# Patient Record
Sex: FEMALE | Race: WHITE | NOT HISPANIC OR LATINO | Employment: FULL TIME
[De-identification: names, ages, dates, MRNs, and addresses within clinical notes are randomized per-mention and may not be internally consistent; named-entity substitution may affect disease eponyms.]

---

## 2017-12-03 ENCOUNTER — HEALTH MAINTENANCE LETTER (OUTPATIENT)
Age: 57
End: 2017-12-03

## 2023-08-07 ENCOUNTER — TRANSFERRED RECORDS (OUTPATIENT)
Dept: HEALTH INFORMATION MANAGEMENT | Facility: CLINIC | Age: 63
End: 2023-08-07

## 2023-08-07 LAB
ALT SERPL-CCNC: 26 U/L (ref 6–29)
AST SERPL-CCNC: 21 U/L (ref 10–35)
CHOLESTEROL (EXTERNAL): 4.1 MG/DL
CREATININE (EXTERNAL): 0.74 MG/DL (ref 0.5–1.05)
GFR ESTIMATED (EXTERNAL): 91 ML/MIN/1.73M2
GLUCOSE (EXTERNAL): 98 MG/DL (ref 65–99)
HBA1C MFR BLD: 5.3 %
HDLC SERPL-MCNC: 40 MG/DL
NON HDL CHOL. (LDL+VLDL): 125 MG/DL
POTASSIUM (EXTERNAL): 4.5 MMOL/L (ref 3.5–5.3)
TRIGLYCERIDES (EXTERNAL): 140 MG/DL
TSH SERPL-ACNC: 2.12 MU/L (ref 0.4–4.5)

## 2023-11-16 ENCOUNTER — OFFICE VISIT (OUTPATIENT)
Dept: CARDIOLOGY | Facility: CLINIC | Age: 63
End: 2023-11-16
Payer: COMMERCIAL

## 2023-11-16 VITALS
RESPIRATION RATE: 20 BRPM | OXYGEN SATURATION: 98 % | HEART RATE: 70 BPM | HEIGHT: 66 IN | WEIGHT: 190.1 LBS | SYSTOLIC BLOOD PRESSURE: 130 MMHG | DIASTOLIC BLOOD PRESSURE: 86 MMHG | BODY MASS INDEX: 30.55 KG/M2

## 2023-11-16 DIAGNOSIS — Z91.89 INTERMEDIATE RISK FOR CORONARY ARTERY DISEASE: ICD-10-CM

## 2023-11-16 DIAGNOSIS — R07.9 CHEST PAIN, UNSPECIFIED TYPE: Primary | ICD-10-CM

## 2023-11-16 PROCEDURE — 99203 OFFICE O/P NEW LOW 30 MIN: CPT | Performed by: INTERNAL MEDICINE

## 2023-11-16 RX ORDER — VITAMIN B COMPLEX
50 TABLET ORAL DAILY
COMMUNITY

## 2023-11-16 RX ORDER — ATORVASTATIN CALCIUM 20 MG/1
20 TABLET, FILM COATED ORAL DAILY
COMMUNITY
End: 2023-11-16

## 2023-11-16 RX ORDER — LOPERAMIDE HYDROCHLORIDE 2 MG/1
2 TABLET ORAL PRN
COMMUNITY

## 2023-11-16 NOTE — PROGRESS NOTES
[facility] HEART CARE   [address]  [place] [address]   [email or web address]   OFFICE: [phone]     CARDIOLOGY CLINIC NOTE     Thank you, Dr. Roman, Dixie Potter, for asking the [facility] Heart Care team to see Ms. Heather Vizcarra to evaluate New Patient (Chest pain, cardiac risk)       Assessment/Recommendations   Assessment:    Chest pain - with borderline high blood pressure and family history of CAD in both parents, she is at intermediate risk of CAD. Chest pain possibly due to angina.    Plan:  CT coronary angiogram to evaluate for ischemic CAD.   Follow up pending abnormal results.         History of Present Illness   Ms. Heather Vizcarra is a 63 year old female who presents for evaluation of cardiac risk and chest pain.    Ms. Alejo Vizcarra has been experiencing chest pain that is associated with emotional stress for the past several months. Located left side of chest. She relates this to stress at work. Unable to correlated with exertion. She is not very active and does not get regular exercise. She is concerned about coronary disease as her father had a CABG in his early 60s.     Other than noted above, Ms. Alejo Vizcarra denies any chest pain/pressure/tightness, shortness of breath at rest or with exertion, light headedness/dizziness, pre-syncope, syncope, lower extremity swelling, palpitations, paroxysmal nocturnal dyspnea (PND), or orthopnea.     Cardiac Problems and Cardiac Diagnostics     Most Recent Cardiac testing:  ECG dated 7/20/21 (personaly reviewed and interpreted): normal sinus rhythm.           Medications  Allergies   Current Outpatient Medications   Medication Sig Dispense Refill    aspirin-acetaminophen-caffeine (EXCEDRIN MIGRAINE) 250-250-65 MG tablet Take 1 tablet by mouth daily as needed      Ibuprofen (ADVIL PO) Take 200 mg by mouth daily as needed for moderate pain      loperamide (IMODIUM A-D) 2 MG tablet  "Take 2 mg by mouth as needed Takes up to 3 to stop diarrhea      Vitamin D3 (CHOLECALCIFEROL) 25 mcg (1000 units) tablet Take 50 mcg by mouth daily      AMOXICILLIN PO Take 250 mg by mouth daily as needed (acne outbreaks) (Patient not taking: Reported on 11/16/2023)      Loratadine (CLARITIN PO) Take 1 tablet by mouth daily as needed (seasonal allergies) (Patient not taking: Reported on 11/16/2023)        Allergies   Allergen Reactions    Erythromycin GI Disturbance    Animal Dander Other (See Comments)    Cats Unknown    Dog Epithelium Allergy Skin Test Other (See Comments)    Dust Mites Unknown    Lactose Unknown    Pollen Extract Headache    Short Ragweed Pollen Ext Other (See Comments)    Trees Headache and Other (See Comments)    Atorvastatin Muscle Pain (Myalgia)        Physical Examination Review of Systems   Vitals: /86 (BP Location: Right arm, Patient Position: Sitting, Cuff Size: Adult Regular)   Pulse 70   Resp 20   Ht 1.664 m (5' 5.5\")   Wt 86.2 kg (190 lb 1.6 oz)   SpO2 98%   BMI 31.15 kg/m    BMI= Body mass index is 31.15 kg/m .  Wt Readings from Last 3 Encounters:   11/16/23 86.2 kg (190 lb 1.6 oz)   12/05/16 79.9 kg (176 lb 1.6 oz)   10/25/16 82.7 kg (182 lb 4.8 oz)       General: pleasant female. No acute distress.   HENT: external ears normal. Nares patent. Mucous membranes moist.  Eyes: perrla, extraocular muscles intact. No scleral icterus.   Neck: No JVD  Lungs: clear to auscultation  COR: regular rate and rhythm, No murmurs, rubs, or gallops  Abd: nondistended, soft  Extrem: No edema        Please refer above for cardiac ROS details.       Past History   Past Medical History:   Past Medical History:   Diagnosis Date    IBS (irritable bowel syndrome)     Migraine     PONV (postoperative nausea and vomiting)     Psoriasis     Urine incontinence         Past Surgical History:   Past Surgical History:   Procedure Laterality Date    COLONOSCOPY      DILATION AND CURETTAGE, OPERATIVE " "HYSTEROSCOPY WITH MORCELLATOR, COMBINED N/A 10/25/2016    Procedure: COMBINED DILATION AND CURETTAGE, OPERATIVE HYSTEROSCOPY WITH MORCELLATOR;  Surgeon: Dixie Roman MD;  Location: [facility]    HYSTERECTOMY VAGINAL, COLPORRHAPHY ANTERIOR, POSTERIOR, COMBINED N/A 12/5/2016    Procedure: COMBINED HYSTERECTOMY VAGINAL, COLPORRHAPHY ANTERIOR, POSTERIOR;  Surgeon: Dixie Roman MD;  Location: [facility]    KNEE SURGERY      THYROID SURGERY      TONSILLECTOMY          Family History:   Mother with CVA and atrial fibrillation  Father with CAD, CABG, CVA      Social History:   Social History     Socioeconomic History    Marital status:      Spouse name: Not on file    Number of children: Not on file    Years of education: Not on file    Highest education level: Not on file   Occupational History    Not on file   Tobacco Use    Smoking status: Never    Smokeless tobacco: Not on file   Substance and Sexual Activity    Alcohol use: No    Drug use: No    Sexual activity: Not on file   Other Topics Concern    Not on file   Social History Narrative    Not on file     Social Determinants of Health     Financial Resource Strain: Not on file   Food Insecurity: Not on file   Transportation Needs: Not on file   Physical Activity: Not on file   Stress: Not on file   Social Connections: Not on file   Interpersonal Safety: Not on file   Housing Stability: Not on file            Lab Results    Chemistry/lipid CBC Cardiac Enzymes/BNP/TSH/INR   No results found for: \"CHOL\", \"HDL\", \"TRIG\", \"CHOLHDL\", \"CREATININE\", \"BUN\", \"NA\", \"CO2\"  8/8/23 from her OB-GYN office  Na 140  K 4.5   Cl 104  CO2 30  BUN 17  Cr 0.7    Total Chol 165  HDL 40    Trig 140   No results found for: \"WBC\", \"HGB\", \"HCT\", \"MCV\", \"PLT\" No results found for: \"CKTOTAL\", \"CKMB\", \"TROPONINI\", \"BNP\", \"TSH\", \"INR\"         "

## 2023-11-16 NOTE — PATIENT INSTRUCTIONS
It was a pleasure to meet with you today.      Below is a summary of your visit.   Schedule a CT scan of your heart to evaluate for coronary artery disease. If your insurance does not cover this then we would evaluate with a stress test and cardiac calcium scan.  We will call you to inform you of your test or procedure results within 3 business days of the test being performed.  If you do not hear from our office with the test results within 1 week please do not hesitate to call asking for these results.  Follow up as needed based on test results.     Please do not hesitate to call the [employer] [facility] Care Clinic with any questions or concerns at [phone].     Sincerely,

## 2023-11-16 NOTE — LETTER
11/16/2023    Dixie Roman MD, MD  [address]  [facility] [address]    RE: Heather Vizcarra       Dear Colleague,     I had the pleasure of seeing Heather Vizcarra in the [facility] Heart Clinic.    [facility] HEART CARE   [address]  [place] [address]   [email or web address]   OFFICE: [phone]     CARDIOLOGY CLINIC NOTE     Thank you, Dr. Roman, Dixie Potter, for asking the [facility] Heart Care team to see Ms. Heather Vizcarra to evaluate New Patient (Chest pain, cardiac risk)       Assessment/Recommendations   Assessment:    Chest pain - with borderline high blood pressure and family history of CAD in both parents, she is at intermediate risk of CAD. Chest pain possibly due to angina.    Plan:  CT coronary angiogram to evaluate for ischemic CAD.   Follow up pending abnormal results.         History of Present Illness   Ms. Heather Vizcarra is a 63 year old female who presents for evaluation of cardiac risk and chest pain.    Ms. Alejo Vizcarra has been experiencing chest pain that is associated with emotional stress for the past several months. Located left side of chest. She relates this to stress at work. Unable to correlated with exertion. She is not very active and does not get regular exercise. She is concerned about coronary disease as her father had a CABG in his early 60s.     Other than noted above, Ms. Alejo Vizcarra denies any chest pain/pressure/tightness, shortness of breath at rest or with exertion, light headedness/dizziness, pre-syncope, syncope, lower extremity swelling, palpitations, paroxysmal nocturnal dyspnea (PND), or orthopnea.     Cardiac Problems and Cardiac Diagnostics     Most Recent Cardiac testing:  ECG dated 7/20/21 (personaly reviewed and interpreted): normal sinus rhythm.           Medications  Allergies   Current Outpatient Medications   Medication Sig Dispense  "Refill    aspirin-acetaminophen-caffeine (EXCEDRIN MIGRAINE) 250-250-65 MG tablet Take 1 tablet by mouth daily as needed      Ibuprofen (ADVIL PO) Take 200 mg by mouth daily as needed for moderate pain      loperamide (IMODIUM A-D) 2 MG tablet Take 2 mg by mouth as needed Takes up to 3 to stop diarrhea      Vitamin D3 (CHOLECALCIFEROL) 25 mcg (1000 units) tablet Take 50 mcg by mouth daily      AMOXICILLIN PO Take 250 mg by mouth daily as needed (acne outbreaks) (Patient not taking: Reported on 11/16/2023)      Loratadine (CLARITIN PO) Take 1 tablet by mouth daily as needed (seasonal allergies) (Patient not taking: Reported on 11/16/2023)        Allergies   Allergen Reactions    Erythromycin GI Disturbance    Animal Dander Other (See Comments)    Cats Unknown    Dog Epithelium Allergy Skin Test Other (See Comments)    Dust Mites Unknown    Lactose Unknown    Pollen Extract Headache    Short Ragweed Pollen Ext Other (See Comments)    Trees Headache and Other (See Comments)    Atorvastatin Muscle Pain (Myalgia)        Physical Examination Review of Systems   Vitals: /86 (BP Location: Right arm, Patient Position: Sitting, Cuff Size: Adult Regular)   Pulse 70   Resp 20   Ht 1.664 m (5' 5.5\")   Wt 86.2 kg (190 lb 1.6 oz)   SpO2 98%   BMI 31.15 kg/m    BMI= Body mass index is 31.15 kg/m .  Wt Readings from Last 3 Encounters:   11/16/23 86.2 kg (190 lb 1.6 oz)   12/05/16 79.9 kg (176 lb 1.6 oz)   10/25/16 82.7 kg (182 lb 4.8 oz)       General: pleasant female. No acute distress.   HENT: external ears normal. Nares patent. Mucous membranes moist.  Eyes: perrla, extraocular muscles intact. No scleral icterus.   Neck: No JVD  Lungs: clear to auscultation  COR: regular rate and rhythm, No murmurs, rubs, or gallops  Abd: nondistended, soft  Extrem: No edema        Please refer above for cardiac ROS details.       Past History   Past Medical History:   Past Medical History:   Diagnosis Date    IBS (irritable bowel " "syndrome)     Migraine     PONV (postoperative nausea and vomiting)     Psoriasis     Urine incontinence         Past Surgical History:   Past Surgical History:   Procedure Laterality Date    COLONOSCOPY      DILATION AND CURETTAGE, OPERATIVE HYSTEROSCOPY WITH MORCELLATOR, COMBINED N/A 10/25/2016    Procedure: COMBINED DILATION AND CURETTAGE, OPERATIVE HYSTEROSCOPY WITH MORCELLATOR;  Surgeon: Dixie Roman MD;  Location: [facility]    HYSTERECTOMY VAGINAL, COLPORRHAPHY ANTERIOR, POSTERIOR, COMBINED N/A 12/5/2016    Procedure: COMBINED HYSTERECTOMY VAGINAL, COLPORRHAPHY ANTERIOR, POSTERIOR;  Surgeon: Diixe Roman MD;  Location: [facility]    KNEE SURGERY      THYROID SURGERY      TONSILLECTOMY          Family History:   Mother with CVA and atrial fibrillation  Father with CAD, CABG, CVA      Social History:   Social History     Socioeconomic History    Marital status:      Spouse name: Not on file    Number of children: Not on file    Years of education: Not on file    Highest education level: Not on file   Occupational History    Not on file   Tobacco Use    Smoking status: Never    Smokeless tobacco: Not on file   Substance and Sexual Activity    Alcohol use: No    Drug use: No    Sexual activity: Not on file   Other Topics Concern    Not on file   Social History Narrative    Not on file     Social Determinants of Health     Financial Resource Strain: Not on file   Food Insecurity: Not on file   Transportation Needs: Not on file   Physical Activity: Not on file   Stress: Not on file   Social Connections: Not on file   Interpersonal Safety: Not on file   Housing Stability: Not on file            Lab Results    Chemistry/lipid CBC Cardiac Enzymes/BNP/TSH/INR   No results found for: \"CHOL\", \"HDL\", \"TRIG\", \"CHOLHDL\", \"CREATININE\", \"BUN\", \"NA\", \"CO2\"  8/8/23 from her OB-GYN office  Na 140  K 4.5   Cl 104  CO2 30  BUN 17  Cr 0.7    Total Chol 165  HDL 40    Trig 140   No results found for: " "\"WBC\", \"HGB\", \"HCT\", \"MCV\", \"PLT\" No results found for: \"CKTOTAL\", \"CKMB\", \"TROPONINI\", \"BNP\", \"TSH\", \"INR\"             Thank you for allowing me to participate in the care of your patient.      Sincerely,     Angel Delcid MD     [facility] Heart Care  cc:   No referring provider defined for this encounter.      "

## 2023-12-13 ENCOUNTER — TELEPHONE (OUTPATIENT)
Dept: CARDIOLOGY | Facility: CLINIC | Age: 63
End: 2023-12-13
Payer: COMMERCIAL

## 2023-12-13 NOTE — TELEPHONE ENCOUNTER
Health Call Center    Phone Message    May a detailed message be left on voicemail: yes     Reason for Call: Other: Pt had eNdovenous ablation on her legs done one on 11.30.23 & 12.8.23.  Will this be an issue for the CTA Angiogram Coronary Artery test on 12.19.23?  Please call pt back to advise if there is a concern or not.  Thank you    Action Taken: Message routed to:  [facility] cardio    Travel Screening: Not Applicable    Thank you!  [facility]

## 2023-12-13 NOTE — TELEPHONE ENCOUNTER
LVM for pt that previous procedures on her legs should not be an issue for CCTA.  Asked for call back with questions.  -ral

## 2023-12-19 ENCOUNTER — HOSPITAL ENCOUNTER (OUTPATIENT)
Dept: CT IMAGING | Facility: CLINIC | Age: 63
Discharge: HOME | End: 2023-12-19
Attending: INTERNAL MEDICINE | Admitting: INTERNAL MEDICINE
Payer: COMMERCIAL

## 2023-12-19 VITALS — SYSTOLIC BLOOD PRESSURE: 148 MMHG | DIASTOLIC BLOOD PRESSURE: 80 MMHG

## 2023-12-19 DIAGNOSIS — Z91.89 INTERMEDIATE RISK FOR CORONARY ARTERY DISEASE: ICD-10-CM

## 2023-12-19 DIAGNOSIS — R07.9 CHEST PAIN, UNSPECIFIED TYPE: ICD-10-CM

## 2023-12-19 LAB
BSA FOR ECHO PROCEDURE: 0 M2
CREAT BLD-MCNC: 0.7 MG/DL (ref 0.6–1.1)
EGFRCR SERPLBLD CKD-EPI 2021: >60 ML/MIN/1.73M2

## 2023-12-19 PROCEDURE — 75574 CT ANGIO HRT W/3D IMAGE: CPT | Mod: 26 | Performed by: INTERNAL MEDICINE

## 2023-12-19 PROCEDURE — 82565 ASSAY OF CREATININE: CPT

## 2023-12-19 PROCEDURE — 75574 CT ANGIO HRT W/3D IMAGE: CPT

## 2023-12-19 PROCEDURE — 250N000013 HC RX MED GY IP 250 OP 250 PS 637: Performed by: INTERNAL MEDICINE

## 2023-12-19 PROCEDURE — 250N000011 HC RX IP 250 OP 636: Mod: JZ | Performed by: INTERNAL MEDICINE

## 2023-12-19 RX ORDER — IOPAMIDOL 755 MG/ML
100 INJECTION, SOLUTION INTRAVASCULAR ONCE
Status: COMPLETED | OUTPATIENT
Start: 2023-12-19 | End: 2023-12-19

## 2023-12-19 RX ORDER — LIDOCAINE 40 MG/G
CREAM TOPICAL
Status: DISCONTINUED | OUTPATIENT
Start: 2023-12-19 | End: 2023-12-20 | Stop reason: HOSPADM

## 2023-12-19 RX ORDER — NITROGLYCERIN 0.4 MG/1
0.4 TABLET SUBLINGUAL ONCE
Status: COMPLETED | OUTPATIENT
Start: 2023-12-19 | End: 2023-12-19

## 2023-12-19 RX ORDER — DILTIAZEM HYDROCHLORIDE 5 MG/ML
10 INJECTION INTRAVENOUS
Status: DISCONTINUED | OUTPATIENT
Start: 2023-12-19 | End: 2023-12-20 | Stop reason: HOSPADM

## 2023-12-19 RX ORDER — METOPROLOL TARTRATE 1 MG/ML
5 INJECTION, SOLUTION INTRAVENOUS
Status: DISCONTINUED | OUTPATIENT
Start: 2023-12-19 | End: 2023-12-20 | Stop reason: HOSPADM

## 2023-12-19 RX ORDER — DILTIAZEM HYDROCHLORIDE 5 MG/ML
5 INJECTION INTRAVENOUS
Status: DISCONTINUED | OUTPATIENT
Start: 2023-12-19 | End: 2023-12-20 | Stop reason: HOSPADM

## 2023-12-19 RX ADMIN — IOPAMIDOL 100 ML: 755 INJECTION, SOLUTION INTRAVENOUS at 14:06

## 2023-12-19 RX ADMIN — NITROGLYCERIN 0.4 MG: 0.4 TABLET SUBLINGUAL at 13:58

## 2024-01-22 ENCOUNTER — OFFICE VISIT (OUTPATIENT)
Dept: SURGERY | Facility: CLINIC | Age: 64
End: 2024-01-22
Payer: COMMERCIAL

## 2024-01-22 VITALS
WEIGHT: 192.5 LBS | DIASTOLIC BLOOD PRESSURE: 80 MMHG | HEIGHT: 66 IN | SYSTOLIC BLOOD PRESSURE: 124 MMHG | BODY MASS INDEX: 30.94 KG/M2

## 2024-01-22 DIAGNOSIS — K42.9 UMBILICAL HERNIA WITHOUT OBSTRUCTION AND WITHOUT GANGRENE: Primary | ICD-10-CM

## 2024-01-22 PROCEDURE — 99202 OFFICE O/P NEW SF 15 MIN: CPT | Performed by: SPECIALIST

## 2024-01-22 NOTE — LETTER
"    1/22/2024         RE: Heather Vizcarra  [address] [facility] [facility] [address]        Dear Colleague,    Thank you for referring your patient, Heather Vizcarra, to the [facility] SURGERY CLINIC AND BARIATRICS CARE [place]. Please see a copy of my visit note below.          HPI:  This is a 63 year old female here today with concerns of pain and bulging in her  not sure.  She said her doctor examined her and told her she had a hernia.  She has no symptomatology no pain or symptoms.  area. She has noted this for the past a a few  weeks. .    Allergies:Erythromycin, Animal dander, Cats, Dog epithelium allergy skin test, Dust mites, Lactose, Pollen extract, Short ragweed pollen ext, Trees, and Atorvastatin    Past Medical History:   Diagnosis Date     IBS (irritable bowel syndrome)      Migraine      PONV (postoperative nausea and vomiting)      Psoriasis      Urine incontinence        Past Surgical History:   Procedure Laterality Date     COLONOSCOPY       DILATION AND CURETTAGE, OPERATIVE HYSTEROSCOPY WITH MORCELLATOR, COMBINED N/A 10/25/2016    Procedure: COMBINED DILATION AND CURETTAGE, OPERATIVE HYSTEROSCOPY WITH MORCELLATOR;  Surgeon: Dixie Roman MD;  Location: [facility]     HYSTERECTOMY VAGINAL, COLPORRHAPHY ANTERIOR, POSTERIOR, COMBINED N/A 12/5/2016    Procedure: COMBINED HYSTERECTOMY VAGINAL, COLPORRHAPHY ANTERIOR, POSTERIOR;  Surgeon: Dixie Roman MD;  Location: [facility]     KNEE SURGERY       THYROID SURGERY       TONSILLECTOMY         CURRENT MEDS:      No family history on file.     reports that she has never smoked. She does not have any smokeless tobacco history on file. She reports that she does not drink alcohol and does not use drugs.    Review of Systems - Negative except told she may have a hernia. Otherwise twelve system of review is negative.      Vitals:    01/22/24 0940   BP: 124/80   Weight: 87.3 kg (192 lb 8 oz)   Height: 1.664 m (5' 5.5\") "       Body mass index is 31.55 kg/m .    EXAM:  General: NAD   HEENT: normocephalic, PERRLA and EOMS intact  Mounth: Mucus membranes moist  Neck: Supple  Chest: Clear to auscultation bilaterally  CV: RRR  ABD: Soft nontender and nondistended, umbilical hernia, reducible  EXT: Warm, pulses intact,   Neuro: Alert and oriented x3  Back: no CVA tenderness      Assessment/Plan:     She thought she had a inguinal hernia I cannot find any inguinal hernias only thing I find is a small umbilical hernia.  We discussed this finding with her she asked that she has to have this fixed I said no it is not symptomatic for her it is very small and sure could be watched discussed surgery and repair and the options at this time point she is going to think about things and probably just watch things.     Ramesh Mcdaniels MD ,MD Ramesh Mcdaniels MD  General Surgery [phone]  Vascular Surgery [phone]          Again, thank you for allowing me to participate in the care of your patient.        Sincerely,        Ramesh Mcdaniels MD

## 2024-01-25 NOTE — PROGRESS NOTES
"      HPI:  This is a 63 year old female here today with concerns of pain and bulging in her  not sure.  She said her doctor examined her and told her she had a hernia.  She has no symptomatology no pain or symptoms.  area. She has noted this for the past a a few  weeks. .    Allergies:Erythromycin, Animal dander, Cats, Dog epithelium allergy skin test, Dust mites, Lactose, Pollen extract, Short ragweed pollen ext, Trees, and Atorvastatin    Past Medical History:   Diagnosis Date    IBS (irritable bowel syndrome)     Migraine     PONV (postoperative nausea and vomiting)     Psoriasis     Urine incontinence        Past Surgical History:   Procedure Laterality Date    COLONOSCOPY      DILATION AND CURETTAGE, OPERATIVE HYSTEROSCOPY WITH MORCELLATOR, COMBINED N/A 10/25/2016    Procedure: COMBINED DILATION AND CURETTAGE, OPERATIVE HYSTEROSCOPY WITH MORCELLATOR;  Surgeon: Dixie Roman MD;  Location: [facility]    HYSTERECTOMY VAGINAL, COLPORRHAPHY ANTERIOR, POSTERIOR, COMBINED N/A 12/5/2016    Procedure: COMBINED HYSTERECTOMY VAGINAL, COLPORRHAPHY ANTERIOR, POSTERIOR;  Surgeon: Dixie Roman MD;  Location: [facility]    KNEE SURGERY      THYROID SURGERY      TONSILLECTOMY         CURRENT MEDS:      No family history on file.     reports that she has never smoked. She does not have any smokeless tobacco history on file. She reports that she does not drink alcohol and does not use drugs.    Review of Systems - Negative except told she may have a hernia. Otherwise twelve system of review is negative.      Vitals:    01/22/24 0940   BP: 124/80   Weight: 87.3 kg (192 lb 8 oz)   Height: 1.664 m (5' 5.5\")       Body mass index is 31.55 kg/m .    EXAM:  General: NAD   HEENT: normocephalic, PERRLA and EOMS intact  Mounth: Mucus membranes moist  Neck: Supple  Chest: Clear to auscultation bilaterally  CV: RRR  ABD: Soft nontender and nondistended, umbilical hernia, reducible  EXT: Warm, pulses intact,   Neuro: Alert " and oriented x3  Back: no CVA tenderness      Assessment/Plan:     She thought she had a inguinal hernia I cannot find any inguinal hernias only thing I find is a small umbilical hernia.  We discussed this finding with her she asked that she has to have this fixed I said no it is not symptomatic for her it is very small and sure could be watched discussed surgery and repair and the options at this time point she is going to think about things and probably just watch things.     Ramesh Mcdaniels MD ,MD Ramesh Mcdaniels MD  General Surgery [phone]  Vascular Surgery [phone]

## 2024-07-26 ENCOUNTER — OFFICE VISIT (OUTPATIENT)
Dept: SURGERY | Facility: CLINIC | Age: 64
End: 2024-07-26
Payer: COMMERCIAL

## 2024-07-26 VITALS — WEIGHT: 186 LBS | BODY MASS INDEX: 30.48 KG/M2

## 2024-07-26 DIAGNOSIS — K42.9 UMBILICAL HERNIA WITHOUT OBSTRUCTION AND WITHOUT GANGRENE: Primary | ICD-10-CM

## 2024-07-26 PROCEDURE — 99212 OFFICE O/P EST SF 10 MIN: CPT | Performed by: SPECIALIST

## 2024-07-26 RX ORDER — ACETAMINOPHEN 325 MG/1
975 TABLET ORAL ONCE
OUTPATIENT
Start: 2024-07-26 | End: 2024-07-26

## 2024-07-26 NOTE — LETTER
7/26/2024      Heather Vizcarra  [address] [facility] [address]      Dear Colleague,    Thank you for referring your patient, Heather Vizcarra, to the [facility] SURGERY CLINIC AND BARIATRICS CARE [place]. Please see a copy of my visit note below.    [facility] Surgery Follow up    HPI:    64 year old year old female who returns for a follow up.  She is here for a follow-up she had an umbilical hernia consult back in January discussed doing hernia repair at that time point she did not follow through now comes back Monday to proceed.  No other changes of history or issues.    Allergies:Erythromycin, Animal dander, Cats, Dog epithelium (canis lupus familiaris), Dust mites, Lactose, Pollen extract, Short ragweed pollen ext, Trees, and Atorvastatin    Past Medical History:   Diagnosis Date     IBS (irritable bowel syndrome)      Migraine      PONV (postoperative nausea and vomiting)      Psoriasis      Urine incontinence        Past Surgical History:   Procedure Laterality Date     COLONOSCOPY       DILATION AND CURETTAGE, OPERATIVE HYSTEROSCOPY WITH MORCELLATOR, COMBINED N/A 10/25/2016    Procedure: COMBINED DILATION AND CURETTAGE, OPERATIVE HYSTEROSCOPY WITH MORCELLATOR;  Surgeon: Dixie Roman MD;  Location: [facility]     HYSTERECTOMY VAGINAL, COLPORRHAPHY ANTERIOR, POSTERIOR, COMBINED N/A 12/5/2016    Procedure: COMBINED HYSTERECTOMY VAGINAL, COLPORRHAPHY ANTERIOR, POSTERIOR;  Surgeon: Dixie Roman MD;  Location: [facility]     KNEE SURGERY       THYROID SURGERY       TONSILLECTOMY         CURRENT MEDS:  Current Outpatient Medications   Medication Sig Dispense Refill     aspirin-acetaminophen-caffeine (EXCEDRIN MIGRAINE) 250-250-65 MG tablet Take 1 tablet by mouth daily as needed       Ibuprofen (ADVIL PO) Take 200 mg by mouth daily as needed for moderate pain       loperamide (IMODIUM A-D) 2 MG tablet Take 2 mg by mouth as needed Takes up to 3 to stop diarrhea        Loratadine (CLARITIN PO) Take 1 tablet by mouth daily as needed (seasonal allergies)       Vitamin D3 (CHOLECALCIFEROL) 25 mcg (1000 units) tablet Take 50 mcg by mouth daily       AMOXICILLIN PO Take 250 mg by mouth daily as needed (acne outbreaks) (Patient not taking: Reported on 7/26/2024)       No current facility-administered medications for this visit.       History reviewed. No pertinent family history.     reports that she has never smoked. She does not have any smokeless tobacco history on file. She reports that she does not drink alcohol and does not use drugs.    Review of Systems:  Negative except umbilical hernia symptomatic. Otherwise twelve system of review is negative.      OBJECTIVE:  Vitals:    07/26/24 1303   Weight: 186 lb (84.4 kg)     Body mass index is 30.48 kg/m .    Status: doing well    EXAM:  GENERAL: This is a well-developed 64 year old female who appears her stated age  HEAD: normocephalic  HEENT: Pupils equal and reactive bilaterally  CARDIAC: RRR without murmur  CHEST/LUNG:  Clear to auscultation  ABDOMEN: Soft, nontender, nondistended, umbilical hernia  NEUROLOGIC: Focally intact, nonfocal  VASCULAR: Pulses intact, symmetrical upper and lower extremities.         Assessment/Plan:   Umbilical hernia without obstruction and without gangrene   Repairs same-day surgery setting discussed and answer questions as today she understands risk of anesthesia fraction bleeding recurrence rates and will get us up in the near future.      No follow-ups on file.     Ramesh Mcdaniels MD ,MD  [facility] Department of Surgery      Again, thank you for allowing me to participate in the care of your patient.        Sincerely,        Ramesh Mcdaniels MD

## 2024-07-26 NOTE — PROGRESS NOTES
[facility] Surgery Follow up    HPI:    64 year old year old female who returns for a follow up.  She is here for a follow-up she had an umbilical hernia consult back in January discussed doing hernia repair at that time point she did not follow through now comes back Monday to proceed.  No other changes of history or issues.    Allergies:Erythromycin, Animal dander, Cats, Dog epithelium (canis lupus familiaris), Dust mites, Lactose, Pollen extract, Short ragweed pollen ext, Trees, and Atorvastatin    Past Medical History:   Diagnosis Date    IBS (irritable bowel syndrome)     Migraine     PONV (postoperative nausea and vomiting)     Psoriasis     Urine incontinence        Past Surgical History:   Procedure Laterality Date    COLONOSCOPY      DILATION AND CURETTAGE, OPERATIVE HYSTEROSCOPY WITH MORCELLATOR, COMBINED N/A 10/25/2016    Procedure: COMBINED DILATION AND CURETTAGE, OPERATIVE HYSTEROSCOPY WITH MORCELLATOR;  Surgeon: Dixie Roman MD;  Location: [facility]    HYSTERECTOMY VAGINAL, COLPORRHAPHY ANTERIOR, POSTERIOR, COMBINED N/A 12/5/2016    Procedure: COMBINED HYSTERECTOMY VAGINAL, COLPORRHAPHY ANTERIOR, POSTERIOR;  Surgeon: Dixie Roman MD;  Location: [facility]    KNEE SURGERY      THYROID SURGERY      TONSILLECTOMY         CURRENT MEDS:  Current Outpatient Medications   Medication Sig Dispense Refill    aspirin-acetaminophen-caffeine (EXCEDRIN MIGRAINE) 250-250-65 MG tablet Take 1 tablet by mouth daily as needed      Ibuprofen (ADVIL PO) Take 200 mg by mouth daily as needed for moderate pain      loperamide (IMODIUM A-D) 2 MG tablet Take 2 mg by mouth as needed Takes up to 3 to stop diarrhea      Loratadine (CLARITIN PO) Take 1 tablet by mouth daily as needed (seasonal allergies)      Vitamin D3 (CHOLECALCIFEROL) 25 mcg (1000 units) tablet Take 50 mcg by mouth daily      AMOXICILLIN PO Take 250 mg by mouth daily as needed (acne outbreaks) (Patient not taking: Reported on 7/26/2024)       No  current facility-administered medications for this visit.       History reviewed. No pertinent family history.     reports that she has never smoked. She does not have any smokeless tobacco history on file. She reports that she does not drink alcohol and does not use drugs.    Review of Systems:  Negative except umbilical hernia symptomatic. Otherwise twelve system of review is negative.      OBJECTIVE:  Vitals:    07/26/24 1303   Weight: 186 lb (84.4 kg)     Body mass index is 30.48 kg/m .    Status: doing well    EXAM:  GENERAL: This is a well-developed 64 year old female who appears her stated age  HEAD: normocephalic  HEENT: Pupils equal and reactive bilaterally  CARDIAC: RRR without murmur  CHEST/LUNG:  Clear to auscultation  ABDOMEN: Soft, nontender, nondistended, umbilical hernia  NEUROLOGIC: Focally intact, nonfocal  VASCULAR: Pulses intact, symmetrical upper and lower extremities.         Assessment/Plan:   Umbilical hernia without obstruction and without gangrene   Repairs same-day surgery setting discussed and answer questions as today she understands risk of anesthesia fraction bleeding recurrence rates and will get us up in the near future.      No follow-ups on file.     Ramesh Mcdaniels MD ,MD  [facility] Department of Surgery

## 2024-08-09 ENCOUNTER — TELEPHONE (OUTPATIENT)
Dept: SURGERY | Facility: CLINIC | Age: 64
End: 2024-08-09
Payer: COMMERCIAL

## 2024-08-09 PROBLEM — K42.9 UMBILICAL HERNIA WITHOUT OBSTRUCTION AND WITHOUT GANGRENE: Status: ACTIVE | Noted: 2024-07-26

## 2024-08-09 NOTE — TELEPHONE ENCOUNTER
I returned call to patient and we scheduled her surgery w/ Dr. Mcdaniels for 10.17.24, per her request. We went over details and I let her know I will send a confirmation letter to her MyChart. She's in agreement with the plan.

## 2024-09-15 ENCOUNTER — HEALTH MAINTENANCE LETTER (OUTPATIENT)
Age: 64
End: 2024-09-15

## 2024-10-09 ENCOUNTER — TELEPHONE (OUTPATIENT)
Dept: SURGERY | Facility: CLINIC | Age: 64
End: 2024-10-09
Payer: COMMERCIAL

## 2024-10-09 NOTE — TELEPHONE ENCOUNTER
Patient is scheduled for open umbilical hernia repair on 10/17/2024.    [facility] calling to report patient had pre-op visit today. PCP is concerned about possible sleep apnea, and has referred patient to sleep medication for evaluation and believes it may be best to have patient in hospital setting for surgery. Routing to surgeon as an update.     Terrie MAXWELL RN, BSN    [facility]  General Surgery  [address]  [place] [address]  Office: [phone]  Employed by [employer]

## 2024-10-16 ENCOUNTER — ANESTHESIA EVENT (OUTPATIENT)
Dept: SURGERY | Facility: AMBULATORY SURGERY CENTER | Age: 64
End: 2024-10-16
Payer: COMMERCIAL

## 2024-10-17 ENCOUNTER — HOSPITAL ENCOUNTER (OUTPATIENT)
Facility: AMBULATORY SURGERY CENTER | Age: 64
Discharge: HOME | End: 2024-10-17
Attending: SPECIALIST
Payer: COMMERCIAL

## 2024-10-17 ENCOUNTER — ANESTHESIA (OUTPATIENT)
Dept: SURGERY | Facility: AMBULATORY SURGERY CENTER | Age: 64
End: 2024-10-17
Payer: COMMERCIAL

## 2024-10-17 VITALS
DIASTOLIC BLOOD PRESSURE: 70 MMHG | BODY MASS INDEX: 29.89 KG/M2 | HEIGHT: 66 IN | HEART RATE: 70 BPM | SYSTOLIC BLOOD PRESSURE: 118 MMHG | WEIGHT: 186 LBS | RESPIRATION RATE: 16 BRPM | TEMPERATURE: 97.2 F | OXYGEN SATURATION: 97 %

## 2024-10-17 DIAGNOSIS — K42.9 UMBILICAL HERNIA WITHOUT OBSTRUCTION AND WITHOUT GANGRENE: Primary | ICD-10-CM

## 2024-10-17 RX ORDER — KETOROLAC TROMETHAMINE 30 MG/ML
INJECTION, SOLUTION INTRAMUSCULAR; INTRAVENOUS PRN
Status: DISCONTINUED | OUTPATIENT
Start: 2024-10-17 | End: 2024-10-17

## 2024-10-17 RX ORDER — ACETAMINOPHEN 325 MG/1
975 TABLET ORAL ONCE
Status: DISCONTINUED | OUTPATIENT
Start: 2024-10-17 | End: 2024-10-18 | Stop reason: HOSPADM

## 2024-10-17 RX ORDER — DEXAMETHASONE SODIUM PHOSPHATE 4 MG/ML
INJECTION, SOLUTION INTRA-ARTICULAR; INTRALESIONAL; INTRAMUSCULAR; INTRAVENOUS; SOFT TISSUE PRN
Status: DISCONTINUED | OUTPATIENT
Start: 2024-10-17 | End: 2024-10-17

## 2024-10-17 RX ORDER — NALOXONE HYDROCHLORIDE 0.4 MG/ML
0.1 INJECTION, SOLUTION INTRAMUSCULAR; INTRAVENOUS; SUBCUTANEOUS
Status: DISCONTINUED | OUTPATIENT
Start: 2024-10-17 | End: 2024-10-18 | Stop reason: HOSPADM

## 2024-10-17 RX ORDER — ONDANSETRON 2 MG/ML
INJECTION INTRAMUSCULAR; INTRAVENOUS PRN
Status: DISCONTINUED | OUTPATIENT
Start: 2024-10-17 | End: 2024-10-17

## 2024-10-17 RX ORDER — ONDANSETRON 4 MG/1
4 TABLET, ORALLY DISINTEGRATING ORAL EVERY 30 MIN PRN
Status: DISCONTINUED | OUTPATIENT
Start: 2024-10-17 | End: 2024-10-18 | Stop reason: HOSPADM

## 2024-10-17 RX ORDER — LIDOCAINE 40 MG/G
CREAM TOPICAL
Status: DISCONTINUED | OUTPATIENT
Start: 2024-10-17 | End: 2024-10-18 | Stop reason: HOSPADM

## 2024-10-17 RX ORDER — ONDANSETRON 2 MG/ML
4 INJECTION INTRAMUSCULAR; INTRAVENOUS EVERY 30 MIN PRN
Status: DISCONTINUED | OUTPATIENT
Start: 2024-10-17 | End: 2024-10-18 | Stop reason: HOSPADM

## 2024-10-17 RX ORDER — OXYCODONE HYDROCHLORIDE 5 MG/1
5 TABLET ORAL
Status: DISCONTINUED | OUTPATIENT
Start: 2024-10-17 | End: 2024-10-18 | Stop reason: HOSPADM

## 2024-10-17 RX ORDER — SODIUM CHLORIDE, SODIUM LACTATE, POTASSIUM CHLORIDE, CALCIUM CHLORIDE 600; 310; 30; 20 MG/100ML; MG/100ML; MG/100ML; MG/100ML
INJECTION, SOLUTION INTRAVENOUS CONTINUOUS
Status: DISCONTINUED | OUTPATIENT
Start: 2024-10-17 | End: 2024-10-18 | Stop reason: HOSPADM

## 2024-10-17 RX ORDER — CEFAZOLIN SODIUM 2 G/100ML
2 INJECTION, SOLUTION INTRAVENOUS SEE ADMIN INSTRUCTIONS
Status: DISCONTINUED | OUTPATIENT
Start: 2024-10-17 | End: 2024-10-18 | Stop reason: HOSPADM

## 2024-10-17 RX ORDER — HYDROCODONE BITARTRATE AND ACETAMINOPHEN 5; 325 MG/1; MG/1
1-2 TABLET ORAL EVERY 6 HOURS PRN
Qty: 18 TABLET | Refills: 0 | Status: SHIPPED | OUTPATIENT
Start: 2024-10-17 | End: 2024-10-20

## 2024-10-17 RX ORDER — LIDOCAINE HYDROCHLORIDE 20 MG/ML
INJECTION, SOLUTION INFILTRATION; PERINEURAL PRN
Status: DISCONTINUED | OUTPATIENT
Start: 2024-10-17 | End: 2024-10-17

## 2024-10-17 RX ORDER — OXYCODONE HYDROCHLORIDE 10 MG/1
10 TABLET ORAL
Status: DISCONTINUED | OUTPATIENT
Start: 2024-10-17 | End: 2024-10-18 | Stop reason: HOSPADM

## 2024-10-17 RX ORDER — PROPOFOL 10 MG/ML
INJECTION, EMULSION INTRAVENOUS CONTINUOUS PRN
Status: DISCONTINUED | OUTPATIENT
Start: 2024-10-17 | End: 2024-10-17

## 2024-10-17 RX ORDER — DEXAMETHASONE SODIUM PHOSPHATE 4 MG/ML
4 INJECTION, SOLUTION INTRA-ARTICULAR; INTRALESIONAL; INTRAMUSCULAR; INTRAVENOUS; SOFT TISSUE
Status: DISCONTINUED | OUTPATIENT
Start: 2024-10-17 | End: 2024-10-18 | Stop reason: HOSPADM

## 2024-10-17 RX ORDER — FENTANYL CITRATE 0.05 MG/ML
25 INJECTION, SOLUTION INTRAMUSCULAR; INTRAVENOUS
Status: DISCONTINUED | OUTPATIENT
Start: 2024-10-17 | End: 2024-10-18 | Stop reason: HOSPADM

## 2024-10-17 RX ORDER — FENTANYL CITRATE 50 UG/ML
INJECTION, SOLUTION INTRAMUSCULAR; INTRAVENOUS PRN
Status: DISCONTINUED | OUTPATIENT
Start: 2024-10-17 | End: 2024-10-17

## 2024-10-17 RX ORDER — CEFAZOLIN SODIUM 2 G/100ML
2 INJECTION, SOLUTION INTRAVENOUS
Status: COMPLETED | OUTPATIENT
Start: 2024-10-17 | End: 2024-10-17

## 2024-10-17 RX ADMIN — FENTANYL CITRATE 25 MCG: 50 INJECTION, SOLUTION INTRAMUSCULAR; INTRAVENOUS at 11:03

## 2024-10-17 RX ADMIN — ONDANSETRON 4 MG: 2 INJECTION INTRAMUSCULAR; INTRAVENOUS at 11:04

## 2024-10-17 RX ADMIN — PROPOFOL 120 MCG/KG/MIN: 10 INJECTION, EMULSION INTRAVENOUS at 10:51

## 2024-10-17 RX ADMIN — LIDOCAINE HYDROCHLORIDE 60 MG: 20 INJECTION, SOLUTION INFILTRATION; PERINEURAL at 10:51

## 2024-10-17 RX ADMIN — FENTANYL CITRATE 25 MCG: 50 INJECTION, SOLUTION INTRAMUSCULAR; INTRAVENOUS at 11:12

## 2024-10-17 RX ADMIN — KETOROLAC TROMETHAMINE 15 MG: 30 INJECTION, SOLUTION INTRAMUSCULAR; INTRAVENOUS at 11:10

## 2024-10-17 RX ADMIN — SODIUM CHLORIDE, SODIUM LACTATE, POTASSIUM CHLORIDE, CALCIUM CHLORIDE: 600; 310; 30; 20 INJECTION, SOLUTION INTRAVENOUS at 10:22

## 2024-10-17 RX ADMIN — DEXAMETHASONE SODIUM PHOSPHATE 10 MG: 4 INJECTION, SOLUTION INTRA-ARTICULAR; INTRALESIONAL; INTRAMUSCULAR; INTRAVENOUS; SOFT TISSUE at 11:04

## 2024-10-17 RX ADMIN — CEFAZOLIN SODIUM 2 G: 2 INJECTION, SOLUTION INTRAVENOUS at 10:49

## 2024-10-17 RX ADMIN — FENTANYL CITRATE 50 MCG: 50 INJECTION, SOLUTION INTRAMUSCULAR; INTRAVENOUS at 10:52

## 2024-10-17 NOTE — ANESTHESIA CARE TRANSFER NOTE
Patient: Heather Vizcarra    Procedure: Procedure(s):  HERNIORRHAPHY, UMBILICAL, OPEN       Diagnosis: Umbilical hernia without obstruction and without gangrene [K42.9]  Diagnosis Additional Information: No value filed.    Anesthesia Type:   MAC     Note:    Oropharynx: oropharynx clear of all foreign objects and spontaneously breathing  Level of Consciousness: drowsy  Oxygen Supplementation: room air    Independent Airway: airway patency satisfactory and stable  Dentition: dentition unchanged  Vital Signs Stable: post-procedure vital signs reviewed and stable  Report to RN Given: handoff report given  Patient transferred to: Phase II    Handoff Report: Identifed the Patient, Identified the Reponsible Provider, Reviewed the pertinent medical history, Discussed the surgical course, Reviewed Intra-OP anesthesia mangement and issues during anesthesia, Set expectations for post-procedure period and Allowed opportunity for questions and acknowledgement of understanding      Vitals:  Vitals Value Taken Time   /64 10/17/24 1119   Temp 97.2  F (36.2  C) 10/17/24 1120   Pulse 79 10/17/24 1119   Resp 16 10/17/24 1120   SpO2 93 % 10/17/24 1120   Vitals shown include unfiled device data.    Electronically Signed By: AJ Sandoval CRNA  October 17, 2024  11:21 AM

## 2024-10-17 NOTE — ANESTHESIA POSTPROCEDURE EVALUATION
Patient: Heather Vizcarra    Procedure: Procedure(s):  HERNIORRHAPHY, UMBILICAL, OPEN       Anesthesia Type:  MAC    Note:  Disposition: Outpatient   Postop Pain Control: Uneventful            Sign Out: Well controlled pain   PONV: No   Neuro/Psych: Uneventful            Sign Out: Acceptable/Baseline neuro status   Airway/Respiratory: Uneventful            Sign Out: Acceptable/Baseline resp. status   CV/Hemodynamics: Uneventful            Sign Out: Acceptable CV status; No obvious hypovolemia; No obvious fluid overload   Other NRE: NONE   DID A NON-ROUTINE EVENT OCCUR? No           Last vitals:  Vitals Value Taken Time   /73 10/17/24 1215   Temp 97.2  F (36.2  C) 10/17/24 1120   Pulse 68 10/17/24 1221   Resp 16 10/17/24 1215   SpO2 97 % 10/17/24 1221   Vitals shown include unfiled device data.    Electronically Signed By: Gina Diaz MD  October 17, 2024  12:53 PM

## 2024-10-17 NOTE — ANESTHESIA PREPROCEDURE EVALUATION
Anesthesia Pre-Procedure Evaluation    Patient: Heather Vizcarra   MRN: [id number] : 1960        Procedure : Procedure(s):  HERNIORRHAPHY, UMBILICAL, OPEN          Past Medical History:   Diagnosis Date    Gastroesophageal reflux disease     Hypertension     IBS (irritable bowel syndrome)     Migraine     PONV (postoperative nausea and vomiting)     Psoriasis     Sleep apnea     Has not been diagnosed but symptoms of sleep apnea-sent to sleep medicine referral    Urine incontinence     resolved      Past Surgical History:   Procedure Laterality Date    COLONOSCOPY      DILATION AND CURETTAGE, OPERATIVE HYSTEROSCOPY WITH MORCELLATOR, COMBINED N/A 10/25/2016    Procedure: COMBINED DILATION AND CURETTAGE, OPERATIVE HYSTEROSCOPY WITH MORCELLATOR;  Surgeon: Dixie Roman MD;  Location: [facility]    HYSTERECTOMY VAGINAL, COLPORRHAPHY ANTERIOR, POSTERIOR, COMBINED N/A 2016    Procedure: COMBINED HYSTERECTOMY VAGINAL, COLPORRHAPHY ANTERIOR, POSTERIOR;  Surgeon: Dixie Roman MD;  Location: [facility]    KNEE SURGERY      THYROID SURGERY      TONSILLECTOMY        Allergies   Allergen Reactions    Erythromycin GI Disturbance    Animal Dander Other (See Comments)    Cats Unknown    Dog Epithelium (Canis Lupus Familiaris) Other (See Comments)    Dust Mites Unknown    Lactose Unknown    Pollen Extract Headache    Short Ragweed Pollen Ext Other (See Comments)    Trees Headache and Other (See Comments)    Atorvastatin Muscle Pain (Myalgia)      Social History     Tobacco Use    Smoking status: Never    Smokeless tobacco: Not on file   Substance Use Topics    Alcohol use: No      Wt Readings from Last 1 Encounters:   10/17/24 84.4 kg (186 lb)        Anesthesia Evaluation   Pt has had prior anesthetic.     History of anesthetic complications  - PONV.      ROS/MED HX  ENT/Pulmonary:     (+) sleep apnea,                                       Neurologic:     (+)      migraines,                         "  Cardiovascular:     (+)  hypertension (no meds)- -   -  - -                                      METS/Exercise Tolerance: >4 METS    Hematologic:  - neg hematologic  ROS     Musculoskeletal:  - neg musculoskeletal ROS     GI/Hepatic:     (+) GERD (intermittent - diet),                   Renal/Genitourinary:  - neg Renal ROS     Endo:  - neg endo ROS     Psychiatric/Substance Use:  - neg psychiatric ROS     Infectious Disease:       Malignancy:       Other:            Physical Exam    Airway        Mallampati: III   TM distance: > 3 FB   Neck ROM: full   Mouth opening: > 3 cm    Respiratory Devices and Support         Dental       (+) Modest Abnormalities - crowns, retainers, 1 or 2 missing teeth      Cardiovascular          Rhythm and rate: regular     Pulmonary           breath sounds clear to auscultation           OUTSIDE LABS:  CBC: No results found for: \"WBC\", \"HGB\", \"HCT\", \"PLT\"  BMP:   Lab Results   Component Value Date    CR 0.7 12/19/2023     COAGS: No results found for: \"PTT\", \"INR\", \"FIBR\"  POC:   Lab Results   Component Value Date    BGM 91 12/06/2016     HEPATIC: No results found for: \"ALBUMIN\", \"PROTTOTAL\", \"ALT\", \"AST\", \"GGT\", \"ALKPHOS\", \"BILITOTAL\", \"BILIDIRECT\", \"NAVDEEP\"  OTHER: No results found for: \"PH\", \"LACT\", \"A1C\", \"MATA\", \"PHOS\", \"MAG\", \"LIPASE\", \"AMYLASE\", \"TSH\", \"T4\", \"T3\", \"CRP\", \"SED\"    Anesthesia Plan    ASA Status:  2    NPO Status:  NPO Appropriate    Anesthesia Type: MAC.   Induction: N/a.   Maintenance: TIVA.        Consents    Anesthesia Plan(s) and associated risks, benefits, and realistic alternatives discussed. Questions answered and patient/representative(s) expressed understanding.     - Discussed:     - Discussed with:  Patient            Postoperative Care    Pain management: Multi-modal analgesia.   PONV prophylaxis: Ondansetron (or other 5HT-3), Dexamethasone or Solumedrol     Comments:    Other Comments:     Propofol gtt  Robinul  Lidocaine  Zofran, decadron 4 mg  LMA " "prn  Toradol if ok with surgeon - 15 mg                 Gina Angie Diaz MD                         # Obesity: Estimated body mass index is 30.48 kg/m  as calculated from the following:    Height as of this encounter: 1.664 m (5' 5.5\").    Weight as of this encounter: 84.4 kg (186 lb).             "

## 2024-10-17 NOTE — BRIEF OP NOTE
[facility]  Operative Note    Pre-operative diagnosis: Umbilical hernia without obstruction and without gangrene [K42.9]  Post-operative diagnosis umbilical hernia    Procedure: HERNIORRHAPHY, UMBILICAL, OPEN, N/A - Abdomen    Surgeon: Surgeons and Role:     * Ramesh Mcdaniels MD - Primary  Anesthesia: MAC with Local   Estimated Blood Loss: 1 mL from 10/17/2024 10:48 AM to 10/17/2024 11:17 AM      Drains: None  Specimens: * No specimens in log *  Findings:   1 cm defect erepaired primarily .  Complications: None.  Implants: * No implants in log *    Procedure:    Consent was obtained and taken the op room placed in supine position MAC was administered anesthesia staff.  Patient's abdomen prepped and draped sterile manner.  A briefing timeout was formed by anesthesia staff.  Began by infiltrating local anesthetic and an inferior to the umbilicus in the field block the area incision was made I dissected the hernia sac I was able to actually reduce all this intra-abdominal he.  This went well with only troubles or difficulties I then at this time point repaired this primarily secondary to his small size with a 0 Ethibond with a figure-of-eight x 1 I tacked the umbilicus down with 3-0 Vicryl in the deep tissues also were closed with 3-0 Vicryl I closed the skin with a 4-0 Monocryl subcuticular stitch Steri-Strips Ultracin applied transferred PACU in stable condition.      All sponge needle counts are correct        Ramesh Mcdaniels MD  General Surgery [phone]  Vascular Surgery [phone]

## 2024-10-17 NOTE — H&P
HPI:    64 year old year old female who returns for a follow up.  She is here for a follow-up she had an umbilical hernia consult back in January discussed doing hernia repair at that time point she did not follow through now comes back Monday to proceed.  No other changes of history or issues.     Allergies:Erythromycin, Animal dander, Cats, Dog epithelium (canis lupus familiaris), Dust mites, Lactose, Pollen extract, Short ragweed pollen ext, Trees, and Atorvastatin     Past Medical History        Past Medical History:   Diagnosis Date    IBS (irritable bowel syndrome)      Migraine      PONV (postoperative nausea and vomiting)      Psoriasis      Urine incontinence              Past Surgical History         Past Surgical History:   Procedure Laterality Date    COLONOSCOPY        DILATION AND CURETTAGE, OPERATIVE HYSTEROSCOPY WITH MORCELLATOR, COMBINED N/A 10/25/2016     Procedure: COMBINED DILATION AND CURETTAGE, OPERATIVE HYSTEROSCOPY WITH MORCELLATOR;  Surgeon: Dixie Roman MD;  Location: [facility]    HYSTERECTOMY VAGINAL, COLPORRHAPHY ANTERIOR, POSTERIOR, COMBINED N/A 12/5/2016     Procedure: COMBINED HYSTERECTOMY VAGINAL, COLPORRHAPHY ANTERIOR, POSTERIOR;  Surgeon: Dixie Roman MD;  Location: [facility]    KNEE SURGERY        THYROID SURGERY        TONSILLECTOMY                CURRENT MEDS:  Current Facility-Administered Medications          Current Outpatient Medications   Medication Sig Dispense Refill    aspirin-acetaminophen-caffeine (EXCEDRIN MIGRAINE) 250-250-65 MG tablet Take 1 tablet by mouth daily as needed        Ibuprofen (ADVIL PO) Take 200 mg by mouth daily as needed for moderate pain        loperamide (IMODIUM A-D) 2 MG tablet Take 2 mg by mouth as needed Takes up to 3 to stop diarrhea        Loratadine (CLARITIN PO) Take 1 tablet by mouth daily as needed (seasonal allergies)        Vitamin D3 (CHOLECALCIFEROL) 25 mcg (1000 units) tablet Take 50 mcg by mouth daily         AMOXICILLIN PO Take 250 mg by mouth daily as needed (acne outbreaks) (Patient not taking: Reported on 7/26/2024)          No current facility-administered medications for this visit.            Family History   History reviewed. No pertinent family history.         reports that she has never smoked. She does not have any smokeless tobacco history on file. She reports that she does not drink alcohol and does not use drugs.     Review of Systems:  Negative except umbilical hernia symptomatic. Otherwise twelve system of review is negative.        OBJECTIVE:  Vitals       Vitals:     07/26/24 1303   Weight: 186 lb (84.4 kg)         Body mass index is 30.48 kg/m .     Status: doing well     EXAM:  GENERAL: This is a well-developed 64 year old female who appears her stated age  HEAD: normocephalic  HEENT: Pupils equal and reactive bilaterally  CARDIAC: RRR without murmur  CHEST/LUNG:  Clear to auscultation  ABDOMEN: Soft, nontender, nondistended, umbilical hernia  NEUROLOGIC: Focally intact, nonfocal  VASCULAR: Pulses intact, symmetrical upper and lower extremities.           Assessment/Plan:   Umbilical hernia without obstruction and without gangrene   Repairs same-day surgery setting discussed and answer questions as today she understands risk of anesthesia fraction bleeding recurrence rates and will get us up in the near future.        No follow-ups on file.      Ramesh Mcdaniels MD ,MD  [facility] Department of Surgery

## 2024-10-17 NOTE — DISCHARGE INSTRUCTIONS
If you have any questions or concerns regarding your procedure, please contact Dr. Mcdaniels, his office number is [phone].    You received a medication called Toradol (a stronger IV ibuprofen) at 11:10 AM. Do NOT take any Ibuprofen / Advil / Aleve / Naproxen or products containing Ibuprofen until 5:10 PM or later.    [facility]   Adult Discharge Orders & Instructions     For 24 hours after surgery    Get plenty of rest.  A responsible adult must stay with you for at least 24 hours after you leave the hospital.   Do not drive or use heavy equipment.  If you have weakness or tingling, don't drive or use heavy equipment until this feeling goes away.  Do not drink alcohol.  Avoid strenuous or risky activities.  Ask for help when climbing stairs.   You may feel lightheaded.  IF so, sit for a few minutes before standing.  Have someone help you get up.   If you have nausea (feel sick to your stomach): Drink only clear liquids such as apple juice, ginger ale, broth or 7-Up.  Rest may also help.  Be sure to drink enough fluids.  Move to a regular diet as you feel able.  You may have a slight fever. Call the doctor if your fever is over 100 F (37.7 C) (taken under the tongue) or lasts longer than 24 hours.  You may have a dry mouth, a sore throat, muscle aches or trouble sleeping.  These should go away after 24 hours.  Do not make important or legal decisions.     Call your doctor for any of the followin.  Signs of infection (fever, growing tenderness at the surgery site, a large amount of drainage or bleeding, severe pain, foul-smelling drainage, redness, swelling).    2. It has been over 8 to 10 hours since surgery and you are still not able to urinate (pass water).    3.  Headache for over 24 hours.

## 2024-10-18 ENCOUNTER — DOCUMENTATION ONLY (OUTPATIENT)
Dept: SURGERY | Facility: CLINIC | Age: 64
End: 2024-10-18
Payer: COMMERCIAL

## 2024-10-18 NOTE — PROGRESS NOTES
Federal Family and Medical Leave Act forms received, completed and signed on providers behalf.    Leave start date: 10/17/2024     Leave end date: 11/14/2024    RTW on 11/14/2024 with no restrictions.     Forms faxed to: [phone], attn: Danielle Grace.    Fax successful.     Forms labeled and sent to HIM for scanning.    Sumanth Martin CMA  [facility]  Sur[facility] Clinic [facility]  [facility] [address] [address]  Office: [phone]  Fax: [phone]

## 2024-10-21 ENCOUNTER — TELEPHONE (OUTPATIENT)
Dept: SURGERY | Facility: CLINIC | Age: 64
End: 2024-10-21
Payer: COMMERCIAL

## 2024-10-21 NOTE — TELEPHONE ENCOUNTER
Left message for patient to return call to clinic to clarify her RTW letter request.     Terrie MAXWELL   RN, BSN    [facility]  General Surgery  [address]  [place] [address]  Office: [phone]  Employed by [employer]

## 2024-11-04 ENCOUNTER — TELEPHONE (OUTPATIENT)
Dept: SURGERY | Facility: CLINIC | Age: 64
End: 2024-11-04
Payer: COMMERCIAL

## 2024-11-04 NOTE — TELEPHONE ENCOUNTER
Patient called and notified that RTW letter was updated and faxed to [phone] as requested. Fax successful.     Terrie MAXWELL   RN, BSN    [facility]  General Surgery  [address] [place] [id number]  Office: [phone]  Employed by [employer]

## 2024-11-04 NOTE — TELEPHONE ENCOUNTER
Patient calling states her RTW note needs to be changed again, employer didn't accept last one. It needs to say RETURN TO WORK 11/15 with no restrictions. Please fax to [phone] attn: Danielle Grace.    Thank you

## 2025-06-01 ENCOUNTER — HEALTH MAINTENANCE LETTER (OUTPATIENT)
Age: 65
End: 2025-06-01